# Patient Record
Sex: FEMALE | Race: WHITE | Employment: UNEMPLOYED | URBAN - METROPOLITAN AREA
[De-identification: names, ages, dates, MRNs, and addresses within clinical notes are randomized per-mention and may not be internally consistent; named-entity substitution may affect disease eponyms.]

---

## 2022-11-10 ENCOUNTER — ANESTHESIA (OUTPATIENT)
Dept: LABOR AND DELIVERY | Age: 32
DRG: 560 | End: 2022-11-10
Payer: MEDICAID

## 2022-11-10 ENCOUNTER — ANESTHESIA EVENT (OUTPATIENT)
Dept: LABOR AND DELIVERY | Age: 32
DRG: 560 | End: 2022-11-10
Payer: MEDICAID

## 2022-11-10 ENCOUNTER — APPOINTMENT (OUTPATIENT)
Dept: LABOR AND DELIVERY | Age: 32
DRG: 560 | End: 2022-11-10
Payer: MEDICAID

## 2022-11-10 ENCOUNTER — HOSPITAL ENCOUNTER (INPATIENT)
Age: 32
LOS: 2 days | Discharge: HOME | DRG: 560 | End: 2022-11-12
Attending: OBSTETRICS & GYNECOLOGY | Admitting: OBSTETRICS & GYNECOLOGY
Payer: MEDICAID

## 2022-11-10 PROBLEM — Z34.03 SUPERVISION OF LOW-RISK FIRST PREGNANCY, THIRD TRIMESTER: Status: ACTIVE | Noted: 2022-11-10

## 2022-11-10 LAB
ABO/RH: NORMAL
ALBUMIN SERPL-MCNC: 3.1 G/DL (ref 3.5–5.2)
ALP BLD-CCNC: 170 U/L (ref 35–104)
ALT SERPL-CCNC: 19 U/L (ref 0–32)
AMPHETAMINE SCREEN, URINE: NOT DETECTED
ANION GAP SERPL CALCULATED.3IONS-SCNC: 12 MMOL/L (ref 7–16)
ANTIBODY SCREEN: NORMAL
AST SERPL-CCNC: 18 U/L (ref 0–31)
BARBITURATE SCREEN URINE: NOT DETECTED
BASOPHILS ABSOLUTE: 0.02 E9/L (ref 0–0.2)
BASOPHILS RELATIVE PERCENT: 0.2 % (ref 0–2)
BENZODIAZEPINE SCREEN, URINE: NOT DETECTED
BILIRUB SERPL-MCNC: 0.6 MG/DL (ref 0–1.2)
BUN BLDV-MCNC: 6 MG/DL (ref 6–20)
CALCIUM SERPL-MCNC: 9.4 MG/DL (ref 8.6–10.2)
CANNABINOID SCREEN URINE: POSITIVE
CHLORIDE BLD-SCNC: 103 MMOL/L (ref 98–107)
CO2: 22 MMOL/L (ref 22–29)
COCAINE METABOLITE SCREEN URINE: NOT DETECTED
CREAT SERPL-MCNC: 0.6 MG/DL (ref 0.5–1)
EOSINOPHILS ABSOLUTE: 0.11 E9/L (ref 0.05–0.5)
EOSINOPHILS RELATIVE PERCENT: 1.2 % (ref 0–6)
FENTANYL SCREEN, URINE: NOT DETECTED
GFR SERPL CREATININE-BSD FRML MDRD: >60 ML/MIN/1.73
GLUCOSE BLD-MCNC: 101 MG/DL (ref 74–99)
HCT VFR BLD CALC: 38.2 % (ref 34–48)
HEMOGLOBIN: 12.8 G/DL (ref 11.5–15.5)
IMMATURE GRANULOCYTES #: 0.09 E9/L
IMMATURE GRANULOCYTES %: 1 % (ref 0–5)
INTEGRITY CHECK, CREATININE, URINE: 14.6
INTEGRITY CHECK, OXIDANT, URINE: <40
INTEGRITY CHECK, PH, URINE: 7.7 (ref 4.5–9)
INTEGRITY CHECK, SPECIFIC GRAVITY, URINE: 1 (ref 1–1.03)
INTEGRITY CHECK, SPECIMEN INTEGRITY, URINE: ABNORMAL
LYMPHOCYTES ABSOLUTE: 2.31 E9/L (ref 1.5–4)
LYMPHOCYTES RELATIVE PERCENT: 25.7 % (ref 20–42)
Lab: ABNORMAL
MCH RBC QN AUTO: 29.3 PG (ref 26–35)
MCHC RBC AUTO-ENTMCNC: 33.5 % (ref 32–34.5)
MCV RBC AUTO: 87.4 FL (ref 80–99.9)
METHADONE SCREEN, URINE: NOT DETECTED
MONOCYTES ABSOLUTE: 0.76 E9/L (ref 0.1–0.95)
MONOCYTES RELATIVE PERCENT: 8.5 % (ref 2–12)
NEUTROPHILS ABSOLUTE: 5.69 E9/L (ref 1.8–7.3)
NEUTROPHILS RELATIVE PERCENT: 63.4 % (ref 43–80)
OPIATE SCREEN URINE: NOT DETECTED
OXYCODONE URINE: NOT DETECTED
PDW BLD-RTO: 14.5 FL (ref 11.5–15)
PHENCYCLIDINE SCREEN URINE: NOT DETECTED
PLATELET # BLD: 270 E9/L (ref 130–450)
PMV BLD AUTO: 11.3 FL (ref 7–12)
POTASSIUM SERPL-SCNC: 3.8 MMOL/L (ref 3.5–5)
RBC # BLD: 4.37 E12/L (ref 3.5–5.5)
SODIUM BLD-SCNC: 137 MMOL/L (ref 132–146)
TOTAL PROTEIN: 6.4 G/DL (ref 6.4–8.3)
WBC # BLD: 9 E9/L (ref 4.5–11.5)

## 2022-11-10 PROCEDURE — 80053 COMPREHEN METABOLIC PANEL: CPT

## 2022-11-10 PROCEDURE — 6370000000 HC RX 637 (ALT 250 FOR IP): Performed by: OBSTETRICS & GYNECOLOGY

## 2022-11-10 PROCEDURE — 2580000003 HC RX 258: Performed by: ADVANCED PRACTICE MIDWIFE

## 2022-11-10 PROCEDURE — 2500000003 HC RX 250 WO HCPCS: Performed by: ANESTHESIOLOGY

## 2022-11-10 PROCEDURE — 86901 BLOOD TYPING SEROLOGIC RH(D): CPT

## 2022-11-10 PROCEDURE — 0HQ9XZZ REPAIR PERINEUM SKIN, EXTERNAL APPROACH: ICD-10-PCS | Performed by: OBSTETRICS & GYNECOLOGY

## 2022-11-10 PROCEDURE — 80074 ACUTE HEPATITIS PANEL: CPT

## 2022-11-10 PROCEDURE — 36415 COLL VENOUS BLD VENIPUNCTURE: CPT

## 2022-11-10 PROCEDURE — 7200000001 HC VAGINAL DELIVERY

## 2022-11-10 PROCEDURE — 86850 RBC ANTIBODY SCREEN: CPT

## 2022-11-10 PROCEDURE — 4A1HXCZ MONITORING OF PRODUCTS OF CONCEPTION, CARDIAC RATE, EXTERNAL APPROACH: ICD-10-PCS | Performed by: OBSTETRICS & GYNECOLOGY

## 2022-11-10 PROCEDURE — 86900 BLOOD TYPING SEROLOGIC ABO: CPT

## 2022-11-10 PROCEDURE — 1220000001 HC SEMI PRIVATE L&D R&B

## 2022-11-10 PROCEDURE — 59050 FETAL MONITOR W/REPORT: CPT

## 2022-11-10 PROCEDURE — 3700000025 EPIDURAL BLOCK: Performed by: ANESTHESIOLOGY

## 2022-11-10 PROCEDURE — 80307 DRUG TEST PRSMV CHEM ANLYZR: CPT

## 2022-11-10 PROCEDURE — 3E033VJ INTRODUCTION OF OTHER HORMONE INTO PERIPHERAL VEIN, PERCUTANEOUS APPROACH: ICD-10-PCS | Performed by: OBSTETRICS & GYNECOLOGY

## 2022-11-10 PROCEDURE — 6360000002 HC RX W HCPCS: Performed by: OBSTETRICS & GYNECOLOGY

## 2022-11-10 PROCEDURE — 85025 COMPLETE CBC W/AUTO DIFF WBC: CPT

## 2022-11-10 PROCEDURE — G0480 DRUG TEST DEF 1-7 CLASSES: HCPCS

## 2022-11-10 RX ORDER — ONDANSETRON 2 MG/ML
4 INJECTION INTRAMUSCULAR; INTRAVENOUS EVERY 6 HOURS PRN
Status: DISCONTINUED | OUTPATIENT
Start: 2022-11-10 | End: 2022-11-12 | Stop reason: HOSPADM

## 2022-11-10 RX ORDER — LIDOCAINE HYDROCHLORIDE 10 MG/ML
INJECTION, SOLUTION INFILTRATION; PERINEURAL
Status: DISPENSED
Start: 2022-11-10 | End: 2022-11-11

## 2022-11-10 RX ORDER — SODIUM CHLORIDE 0.9 % (FLUSH) 0.9 %
5-40 SYRINGE (ML) INJECTION PRN
Status: DISCONTINUED | OUTPATIENT
Start: 2022-11-10 | End: 2022-11-12 | Stop reason: HOSPADM

## 2022-11-10 RX ORDER — SODIUM CHLORIDE 9 MG/ML
INJECTION, SOLUTION INTRAVENOUS PRN
Status: DISCONTINUED | OUTPATIENT
Start: 2022-11-10 | End: 2022-11-12 | Stop reason: HOSPADM

## 2022-11-10 RX ORDER — MISOPROSTOL 200 UG/1
800 TABLET ORAL PRN
Status: DISCONTINUED | OUTPATIENT
Start: 2022-11-10 | End: 2022-11-12 | Stop reason: HOSPADM

## 2022-11-10 RX ORDER — ACETAMINOPHEN 500 MG
1000 TABLET ORAL EVERY 8 HOURS
Status: DISCONTINUED | OUTPATIENT
Start: 2022-11-10 | End: 2022-11-12 | Stop reason: HOSPADM

## 2022-11-10 RX ORDER — DOCUSATE SODIUM 100 MG/1
100 CAPSULE, LIQUID FILLED ORAL DAILY
Status: DISCONTINUED | OUTPATIENT
Start: 2022-11-10 | End: 2022-11-12 | Stop reason: HOSPADM

## 2022-11-10 RX ORDER — SODIUM CHLORIDE 0.9 % (FLUSH) 0.9 %
5-40 SYRINGE (ML) INJECTION EVERY 12 HOURS SCHEDULED
Status: DISCONTINUED | OUTPATIENT
Start: 2022-11-10 | End: 2022-11-12 | Stop reason: HOSPADM

## 2022-11-10 RX ORDER — METHYLERGONOVINE MALEATE 0.2 MG/ML
200 INJECTION INTRAVENOUS PRN
Status: DISCONTINUED | OUTPATIENT
Start: 2022-11-10 | End: 2022-11-12 | Stop reason: HOSPADM

## 2022-11-10 RX ORDER — SODIUM CHLORIDE, SODIUM LACTATE, POTASSIUM CHLORIDE, CALCIUM CHLORIDE 600; 310; 30; 20 MG/100ML; MG/100ML; MG/100ML; MG/100ML
INJECTION, SOLUTION INTRAVENOUS CONTINUOUS
Status: DISCONTINUED | OUTPATIENT
Start: 2022-11-10 | End: 2022-11-12 | Stop reason: HOSPADM

## 2022-11-10 RX ORDER — NALOXONE HYDROCHLORIDE 0.4 MG/ML
INJECTION, SOLUTION INTRAMUSCULAR; INTRAVENOUS; SUBCUTANEOUS PRN
Status: DISCONTINUED | OUTPATIENT
Start: 2022-11-10 | End: 2022-11-12 | Stop reason: HOSPADM

## 2022-11-10 RX ORDER — FERROUS SULFATE 325(65) MG
325 TABLET ORAL 2 TIMES DAILY WITH MEALS
Status: DISCONTINUED | OUTPATIENT
Start: 2022-11-10 | End: 2022-11-12 | Stop reason: HOSPADM

## 2022-11-10 RX ORDER — SODIUM CHLORIDE 9 MG/ML
25 INJECTION, SOLUTION INTRAVENOUS PRN
Status: DISCONTINUED | OUTPATIENT
Start: 2022-11-10 | End: 2022-11-12 | Stop reason: HOSPADM

## 2022-11-10 RX ORDER — SODIUM CHLORIDE, SODIUM LACTATE, POTASSIUM CHLORIDE, AND CALCIUM CHLORIDE .6; .31; .03; .02 G/100ML; G/100ML; G/100ML; G/100ML
1000 INJECTION, SOLUTION INTRAVENOUS PRN
Status: DISCONTINUED | OUTPATIENT
Start: 2022-11-10 | End: 2022-11-12 | Stop reason: HOSPADM

## 2022-11-10 RX ORDER — SODIUM CHLORIDE, SODIUM LACTATE, POTASSIUM CHLORIDE, AND CALCIUM CHLORIDE .6; .31; .03; .02 G/100ML; G/100ML; G/100ML; G/100ML
500 INJECTION, SOLUTION INTRAVENOUS PRN
Status: DISCONTINUED | OUTPATIENT
Start: 2022-11-10 | End: 2022-11-12 | Stop reason: HOSPADM

## 2022-11-10 RX ORDER — CARBOPROST TROMETHAMINE 250 UG/ML
250 INJECTION, SOLUTION INTRAMUSCULAR PRN
Status: DISCONTINUED | OUTPATIENT
Start: 2022-11-10 | End: 2022-11-12 | Stop reason: HOSPADM

## 2022-11-10 RX ORDER — IBUPROFEN 800 MG/1
800 TABLET ORAL EVERY 8 HOURS
Status: DISCONTINUED | OUTPATIENT
Start: 2022-11-10 | End: 2022-11-12 | Stop reason: HOSPADM

## 2022-11-10 RX ORDER — DOCUSATE SODIUM 100 MG/1
100 CAPSULE, LIQUID FILLED ORAL 2 TIMES DAILY
Status: DISCONTINUED | OUTPATIENT
Start: 2022-11-10 | End: 2022-11-10 | Stop reason: SDUPTHER

## 2022-11-10 RX ORDER — MODIFIED LANOLIN
OINTMENT (GRAM) TOPICAL PRN
Status: DISCONTINUED | OUTPATIENT
Start: 2022-11-10 | End: 2022-11-12 | Stop reason: HOSPADM

## 2022-11-10 RX ADMIN — IBUPROFEN 800 MG: 800 TABLET, FILM COATED ORAL at 20:02

## 2022-11-10 RX ADMIN — Medication 5 ML: at 14:46

## 2022-11-10 RX ADMIN — SODIUM CHLORIDE, POTASSIUM CHLORIDE, SODIUM LACTATE AND CALCIUM CHLORIDE: 600; 310; 30; 20 INJECTION, SOLUTION INTRAVENOUS at 08:10

## 2022-11-10 RX ADMIN — Medication 10 ML: at 14:36

## 2022-11-10 RX ADMIN — Medication 15 ML/HR: at 14:45

## 2022-11-10 RX ADMIN — Medication 2 MILLI-UNITS/MIN: at 08:52

## 2022-11-10 ASSESSMENT — PAIN SCALES - GENERAL: PAINLEVEL_OUTOF10: 3

## 2022-11-10 ASSESSMENT — LIFESTYLE VARIABLES
SMOKING_STATUS: 0
SMOKING_STATUS: 0

## 2022-11-10 NOTE — PROGRESS NOTES
Dr. Zuri Craig at bedside. Notified of patient status and cervical exam. MD states to start pitocin.

## 2022-11-10 NOTE — PROGRESS NOTES
This RN calls out from room for nursery to come to delivery and to call Dr. Molly Soto for delivery now.

## 2022-11-10 NOTE — ANESTHESIA PRE PROCEDURE
Department of Anesthesiology  Preprocedure Note       Name:  Danielle Orellana   Age:  29 y.o.  :  1990                                          MRN:  [id number]         Date:  11/10/2022      Surgeon: [address] [place] [address]    Procedure: LABOR OPTIONS (AUNDREA)    Medications prior to admission:   Prior to Admission medications    Medication Sig Start Date End Date Taking?  Authorizing Provider   Prenatal MV-Min-Fe Fum-FA-DHA (PRENATAL 1 PO) Take by mouth   Yes Historical Provider, MD       Current medications:    Current Facility-Administered Medications   Medication Dose Route Frequency Provider Last Rate Last Admin    lactated ringers infusion   IntraVENous Continuous Bhargavi Lipschutz, APRN -  mL/hr at 11/10/22 1244 Rate Verify at 11/10/22 1244    lactated ringers bolus  500 mL IntraVENous PRN Main Bolus Ridenbaugh, APRN - CNM        Or    lactated ringers bolus  1,000 mL IntraVENous PRN Bhargavi Lipschutz, APRN - CNM        sodium chloride flush 0.9 % injection 5-40 mL  5-40 mL IntraVENous 2 times per day Bhargavi Lipschutz, APRN - CNM        sodium chloride flush 0.9 % injection 5-40 mL  5-40 mL IntraVENous PRN Bhargavi Lipschutz, APRN - CNM        0.9 % sodium chloride infusion  25 mL IntraVENous PRN Bhargavi Lipschutz, APRN - CNM        methylergonovine (METHERGINE) injection 200 mcg  200 mcg IntraMUSCular PRN Bhargavi Lipschutz, APRN - CNM        carboprost (HEMABATE) injection 250 mcg  250 mcg IntraMUSCular PRN Bhargavi Lipschutz, APRN - CNM        miSOPROStol (CYTOTEC) tablet 800 mcg  800 mcg Rectal PRN Bhargavi Lipschutz, APRN - CNM        tranexamic acid (CYKLOKAPRON) 1,000 mg in sodium chloride 0.9 % 100 mL IVPB  1,000 mg IntraVENous Once PRN Main Bolus Ridenbaugh, APRN - CNM        oxytocin (PITOCIN) 30 units in 500 mL infusion  87.3 des-units/min IntraVENous Continuous PRN Main Bolus Ridenbaugh, APRN - CNM        And    oxytocin (PITOCIN) 30 units in 500 mL infusion  909 des-units/min IntraVENous PRN JEOVANY Gonzales CNM        ondansetron [facility]) injection 4 mg  4 mg IntraVENous Q6H PRN JEOVANY Gonzales CNM        docusate sodium (COLACE) capsule 100 mg  100 mg Oral BID Lydia ElaineJEOVANY Dickerson CNM        oxytocin (PITOCIN) 30 units in 500 mL infusion  1-20 des-units/min IntraVENous Continuous Virgie Hodgkin, MD 12 mL/hr at 11/10/22 1244 12 des-units/min at 11/10/22 1244    lidocaine 1 % injection             naloxone 0.4 mg in 10 mL sodium chloride syringe   IntraVENous PRN Chalmers Sicard, MD        ondansetron [facility]) injection 4 mg  4 mg IntraVENous Q6H PRN Chalmers Sicard, MD        fentaNYL 1.85mcg/ml and bupivacaine 0.1% 15ml syringe (Home [facility]) (OB) 15 mL epidural  15 mL Epidural Once Chalmers Sicard, MD        fentaNYL 1.85mcg/ml and Bupivicaine 0.1% in 0.9% NS 135ml infusion (OB) epidural  15 mL/hr Epidural Continuous Chalmers Sicard, MD           Allergies: Allergies   Allergen Reactions    Pcn [Penicillins]        Problem List:    Patient Active Problem List   Diagnosis Code    Supervision of low-risk first pregnancy, third trimester Z34.03    Diffuse cystic mastopathy N60.19    History of narcotic addiction ([facility] [address] F11.21    Pap smear of cervix with ASCUS, cannot exclude HGSIL R87.611       Past Medical History:        Diagnosis Date    Hepatitis C     History of cholecystectomy        Past Surgical History:  No past surgical history on file.     Social History:    Social History     Tobacco Use    Smoking status: Not on file    Smokeless tobacco: Not on file   Substance Use Topics    Alcohol use: Not on file                                Counseling given: Not Answered      Vital Signs (Current):   Vitals:    11/10/22 0955 11/10/22 1022 11/10/22 1125 11/10/22 1216   BP: 118/68  126/71 118/60   Pulse: 78  64 82   Resp:       Temp:       TempSrc:       SpO2:       Weight:  298 lb (135.2 kg)     Height:  5' 7\" (1.702 m) BP Readings from Last 3 Encounters:   11/10/22 118/60       NPO Status: Time of last liquid consumption: 0400                        Time of last solid consumption: 0400                        Date of last liquid consumption: 11/10/22                        Date of last solid food consumption: 11/10/22    BMI:   Wt Readings from Last 3 Encounters:   11/10/22 298 lb (135.2 kg)     Body mass index is 46.67 kg/m². CBC:   Lab Results   Component Value Date/Time    WBC 9.0 11/10/2022 08:20 AM    RBC 4.37 11/10/2022 08:20 AM    RBC 3.39 10/26/2015 03:12 AM    HGB 12.8 11/10/2022 08:20 AM    HCT 38.2 11/10/2022 08:20 AM    MCV 87.4 11/10/2022 08:20 AM    RDW 14.5 11/10/2022 08:20 AM     11/10/2022 08:20 AM       CMP:   Lab Results   Component Value Date/Time     11/10/2022 08:20 AM    K 3.8 11/10/2022 08:20 AM     11/10/2022 08:20 AM    CO2 22 11/10/2022 08:20 AM    BUN 6 11/10/2022 08:20 AM    CREATININE 0.6 11/10/2022 08:20 AM    LABGLOM >60 11/10/2022 08:20 AM    GLUCOSE 101 11/10/2022 08:20 AM    PROT 6.4 11/10/2022 08:20 AM    CALCIUM 9.4 11/10/2022 08:20 AM    BILITOT 0.6 11/10/2022 08:20 AM    ALKPHOS 170 11/10/2022 08:20 AM    AST 18 11/10/2022 08:20 AM    ALT 19 11/10/2022 08:20 AM       POC Tests: No results for input(s): POCGLU, POCNA, POCK, POCCL, POCBUN, POCHEMO, POCHCT in the last 72 hours. Coags:   Lab Results   Component Value Date/Time    PROTIME 10.4 10/20/2015 03:45 AM    INR 1.0 10/20/2015 03:45 AM    APTT 55.4 10/19/2015 04:10 AM       HCG (If Applicable): No results found for: PREGTESTUR, PREGSERUM, HCG, HCGQUANT     ABGs:   Lab Results   Component Value Date/Time    BEART -0.7 10/23/2015 04:55 AM        Type & Screen (If Applicable):  Lab Results   Component Value Date    LABABO A 10/21/2015       Drug/Infectious Status (If Applicable):  Lab Results   Component Value Date/Time    HEPCAB DETECTED 10/17/2015 11:20 AM       COVID-19 Screening (If Applicable):  No results found for: COVID19        Anesthesia Evaluation  Patient summary reviewed and Nursing notes reviewed no history of anesthetic complications:   Airway: Mallampati: III  TM distance: >3 FB   Neck ROM: full  Mouth opening: > = 3 FB   Dental:          Pulmonary:normal exam        (-) recent URI and not a current smoker                           Cardiovascular:Negative CV ROS  Exercise tolerance: good (>4 METS),       (-) hypertension and dysrhythmias      Rhythm: regular  Rate: normal           Beta Blocker:  Not on Beta Blocker         Neuro/Psych:                ROS comment: +UDS marijuana   GI/Hepatic/Renal:   (+) GERD: well controlled, hepatitis: C, liver disease (Elevated alk phos):, morbid obesity          Endo/Other:        (-) blood dyscrasia, no electrolyte abnormalities        Pt had no PAT visit       Abdominal:   (+) obese,           Vascular: negative vascular ROS. Other Findings:           Anesthesia Plan      ASA 2             Anesthetic plan and risks discussed with patient.         Attending anesthesiologist reviewed and agrees with Preprocedure content                JEOVANY Stallworth CRNA   11/10/2022

## 2022-11-10 NOTE — PROGRESS NOTES
RN at bedside for epidural. Continuing to try to trace fetal heart rate. Signal intermittent due to maternal size and positioning for epidural. Patient perceives fetal movement and audible fetal movement noted.

## 2022-11-10 NOTE — PROGRESS NOTES
I was called to room to [facility] by\" for patient with strong urge to push. Upon entering room, patient found to be complete and +2, actively pushing. I quickly gowned and gloved in time for patient to deliver a viable female infant. Infant head was  upon my arrival, patient pushed well to deliver a viable female infant: OA-->SLY. A nuchal cord was noted and reduced x 1. After restitution, the anterior shoulder delivered easily with maternal effort and the rest of the body easily followed. Pitocin was opened at that time. Spontaneous respirations and cries noted so infant was placed upon maternal abdomen. Once pulsing ceased, the umbilical cord was clamped x 2 and cut by fob. Cord blood collected. Placenta delivered several minutes later via maternal effort and gentle traction via Palomo Skeans mechanism. Fundus firm at U/1 and midline. Perineal inspection revealed bilateral labial abrasions. One abrasion on the right was oozing so I placed 1 interrupted stitch of 3-0 vicryl. Hemostasis was achieved and patient was stable. Dr. Luis Miguel Nixon was present at that time, please refer to his notes for ongoing status and care.

## 2022-11-10 NOTE — ANESTHESIA PROCEDURE NOTES
Epidural Block    Patient location during procedure: OB  Start time: 11/10/2022 2:18 PM  End time: 11/10/2022 2:30 PM  Reason for block: labor epidural  Staffing  Performed: resident/CRNA   Anesthesiologist: Helen Jaramillo MD  Resident/CRNA: JEOVANY Alaniz CRNA  Epidural  Patient position: sitting  Prep: Betadine  Patient monitoring: cardiac monitor, continuous pulse ox and frequent blood pressure checks  Approach: midline  Location: L3-4  Injection technique: JUAN air  Provider prep: mask and sterile gloves  Needle  Needle type: Tuohy   Needle gauge: 18 G  Needle length: 3.5 in  Needle insertion depth: 9 cm  Catheter type: end hole  Catheter size: 20 G.   Catheter at skin depth: 15 cm  Test dose: negativeCatheter Secured: tegaderm and tape  Assessment  Sensory level: T6  Hemodynamics: stable  Attempts: 1  Outcomes: uncomplicated and patient tolerated procedure well  Preanesthetic Checklist  Completed: patient identified, IV checked, site marked, risks and benefits discussed, surgical/procedural consents, equipment checked, pre-op evaluation, timeout performed, anesthesia consent given, oxygen available and monitors applied/VS acknowledged

## 2022-11-10 NOTE — L&D DELIVERY SUMMARY NOTE
Department of Obstetrics and Gynecology  Spontaneous Vaginal Delivery Note  Roula Henley QQI,[id number]    Pre-operative Diagnosis: Term pregnancy induction of labor with Pitocin history of hepatitis C and class III obesity    Post-operative Diagnosis:  Living  infant(s) and Female    Information for the patient's :  Mk López [[id number]]                Female infant       Infant Wt:   Information for the patient's :  Mk López [[id number]]      6 pound 11 ounce    APGARS:     Information for the patient's :  Mk López [[id number]]      8 at 1 minute 9 at 5 minutes     Anesthesia:  epidural anesthesia    Application and Delivery: First-degree perineal tear    Delivery Summary:   Patient is [facility] to [facility] and placed on external monitors. Pitocin induction started as per protocol contractions are irregular,FHT reactive with moderate variability without decels. Pt received analgesics IV and or epidural anesthesia. Progress of labor as anticipated,  and now fully dilated cervix. With subsequent contractions she started pushing and delivered vaginally spontaneously without any complications with first-degree perineal tear superficial,Placenta and cord and membranes delivered spontaneously. Pt is given pitocin in IV fluids. .First-degree perineal.  Superficial tear repaired in standard manner. Vaginal walls,cervix,perineum,rectum intact. Uterus is well contracted. Pt is watched for next 1 hour. mother and baby both are  stable and doing well. Routine postpartum care    Specimen:  Placenta sent to pathology No    Estimated blood loss: 300 cc             Condition:  infant stable to general nursery and mother stable    Awaiting for hepatitis B surface antigen report           Infant Feeding:    both breast and bottle - Similac with low iron    Erik Limon MD, M.D. 11/10/2022 5:55 PM    [place]

## 2022-11-10 NOTE — PROGRESS NOTES
Patient is a  @ 39.0 weeks gestation here for a scheduled induction. Patient denies any pain or complaints. Ambulatory upon arrival. Accompanied by her boyfriend Shawn Jovel.

## 2022-11-10 NOTE — PROGRESS NOTES
Kimmie Cardoza in room for procedure at 1412. Patient sitting at side of bed for epidural insertion at 1400. Epidural catheter placed at 29  Test dose given by SEBASTIAN Chaudhry at 1429. Epidural bolus given by SEBASTIAN Mckeon at    Patient returned to semi-skinner's position in bed at 1432. Epidural pump programmed to continuously infuse by SEBASTIAN Chaudhry at 1445.

## 2022-11-10 NOTE — H&P
Department of Obstetrics and Gynecology   Obstetrics History and Physical      [place] Seat  11/10/2022  [id number]    CHIEF COMPLAINT: Term pregnancy    HISTORY OF PRESENT ILLNESS:      The patient is a 29 y.o. female  at term pregnancy for induction of labor with Pitocin with inducible cervix Ramírez score more than 6 GBS negative  OB History          1    Para        Term                AB        Living             SAB        IAB        Ectopic        Molar        Multiple        Live Births                Patient presents with a chief complaint as above and is being admitted for induction of labor with Pitocin    Estimated Due Date: Estimated Date of Delivery: None noted. PRENATAL CARE:    Complicated by: There is no problem list on file for this patient. PAST OB HISTORY  OB History          1    Para        Term                AB        Living             SAB        IAB        Ectopic        Molar        Multiple        Live Births                    Past Medical History:    No past medical history on file. Past Surgical History:    No past surgical history on file.   Allergies:  Pcn [penicillins]  Social History:    Social History     Socioeconomic History    Marital status: Single     Spouse name: Not on file    Number of children: Not on file    Years of education: Not on file    Highest education level: Not on file   Occupational History    Not on file   Tobacco Use    Smoking status: Not on file    Smokeless tobacco: Not on file   Substance and Sexual Activity    Alcohol use: Not on file    Drug use: Not on file    Sexual activity: Not on file   Other Topics Concern    Not on file   Social History Narrative    Not on file     Social Determinants of Health     Financial Resource Strain: Not on file   Food Insecurity: Not on file   Transportation Needs: Not on file   Physical Activity: Not on file   Stress: Not on file   Social Connections: Not on file   Intimate Partner Violence: Not on file   Housing Stability: Not on file     Family History:   No family history on file. Medications Prior to Admission:  No medications prior to admission. REVIEW OF SYSTEMS:    CONSTITUTIONAL:  negative  RESPIRATORY:  negative  CARDIOVASCULAR:  negative  GASTROINTESTINAL:  Negative  GENITOURINARY: Negative  ALLERGIC/IMMUNOLOGIC:  negative  NEUROLOGICAL:  negative  BEHAVIOR/PSYCH:  negative    PHYSICAL EXAM:  There were no vitals taken for this visit. General appearance:  awake, alert, cooperative, no apparent distress, and appears stated age  Abdomen:  Gravid  uterus, consistent with her gestational age 42reg  Uterine contractions. , CVA tenderness No      Fetal heart rate:  Reassuring.  140,with accels and moderate variability,no decels,  Pelvis:  Adequate pelvis  Cervix: soft   3 cm   50%    -2  Presentation: CEPHALIC  Membranes:  intact  Extremities: nontender bilaterally,edema1+    DATA:  Labs:  CBC:   Lab Results   Component Value Date/Time    WBC 8.1 10/26/2015 03:12 AM    RBC 3.39 10/26/2015 03:12 AM    HGB 9.9 10/26/2015 03:12 AM    HCT 29.9 10/26/2015 03:12 AM    MCV 88.3 10/26/2015 03:12 AM    RDW 13.5 10/26/2015 03:12 AM     10/26/2015 03:12 AM     CMP:    Lab Results   Component Value Date/Time     10/28/2015 05:31 AM    K 3.9 10/28/2015 05:31 AM     10/28/2015 05:31 AM    CO2 23 10/28/2015 05:31 AM    BUN 8 10/28/2015 05:31 AM    PROT 6.9 10/26/2015 03:12 AM     U/A:  No components found for: [employer], USPGRAV, UPH, UPROTEIN, UGLUCOSE, UKETONE, UBILI, UBLOOD, UNITRITE, UUROBIL, ULEUKEST, USQEPI, URENEPI, UWBC, URBC, Synchari, Brickeys  TSH:    Lab Results   Component Value Date/Time    TSH 4.07 10/17/2015 11:20 AM     RPR:  No results found for: RPR  RUBELLA: No results found for: RUBELLAIGG  HEPBSAG: No results found for: HBSAGI  HIV:  No results found for: HIV  HgBA1c:  No components found for: HGBA1C  Blood Type:  No results found for: ABOINT  RH:  No results found for: ANATITER, C3, C4, RF  Antibody Screen:  No components found for: ABSCINT  Gonorrhea:  No components found for: [address]  Chlamydia:  No components found for: CCHLAM  gbs-STREP B SCREEN: No results found for: GBSAG    No orders to display       No results found for this visit on 11/10/22. ASSESSMENT AND PLAN:full term pregnancy, history of hepatitis C, GBS negative class III obesity  Active Problems:    * No active hospital problems. *  Resolved Problems:    * No resolved hospital problems.  *    Pitocin induction as per protocol  Labor: OBSERVATION, anticipate normal delivery, routine labor orders  Fetus: Reassuring  GBS: Negative  IVFluids,labs,analgesics/epidural as needed        Electronically signed by Fifi Curtis MD on 11/10/2022 at 8:17 AM

## 2022-11-10 NOTE — PROGRESS NOTES
Dr. Jannis Libman called and notified that patient is requesting an epidural. MD states she may have one.

## 2022-11-11 LAB
COMMENT: NORMAL
HCT VFR BLD CALC: 39.6 % (ref 34–48)
HEMOGLOBIN: 13.2 G/DL (ref 11.5–15.5)
THC NORMALIZED, QUANTITIATIVE, URINE: 225.3
THC-COOH, QUANTITATIVE, URINE: 32.9

## 2022-11-11 PROCEDURE — 6370000000 HC RX 637 (ALT 250 FOR IP): Performed by: OBSTETRICS & GYNECOLOGY

## 2022-11-11 PROCEDURE — 36415 COLL VENOUS BLD VENIPUNCTURE: CPT

## 2022-11-11 PROCEDURE — 85014 HEMATOCRIT: CPT

## 2022-11-11 PROCEDURE — 85018 HEMOGLOBIN: CPT

## 2022-11-11 PROCEDURE — 1220000001 HC SEMI PRIVATE L&D R&B

## 2022-11-11 RX ADMIN — DOCUSATE SODIUM 100 MG: 100 CAPSULE, LIQUID FILLED ORAL at 08:56

## 2022-11-11 RX ADMIN — IBUPROFEN 800 MG: 800 TABLET, FILM COATED ORAL at 14:14

## 2022-11-11 RX ADMIN — IBUPROFEN 800 MG: 800 TABLET, FILM COATED ORAL at 05:22

## 2022-11-11 ASSESSMENT — PAIN - FUNCTIONAL ASSESSMENT
PAIN_FUNCTIONAL_ASSESSMENT: ACTIVITIES ARE NOT PREVENTED
PAIN_FUNCTIONAL_ASSESSMENT: ACTIVITIES ARE NOT PREVENTED

## 2022-11-11 ASSESSMENT — PAIN SCALES - GENERAL
PAINLEVEL_OUTOF10: 0
PAINLEVEL_OUTOF10: 5
PAINLEVEL_OUTOF10: 3

## 2022-11-11 ASSESSMENT — PAIN DESCRIPTION - LOCATION
LOCATION: VAGINA
LOCATION: PERINEUM

## 2022-11-11 ASSESSMENT — PAIN DESCRIPTION - ORIENTATION: ORIENTATION: LOWER

## 2022-11-11 ASSESSMENT — PAIN DESCRIPTION - DESCRIPTORS
DESCRIPTORS: TENDER
DESCRIPTORS: PRESSURE;SORE

## 2022-11-11 NOTE — PLAN OF CARE
Problem: Pain  Goal: Verbalizes/displays adequate comfort level or baseline comfort level  Outcome: Progressing   Medicate as needed, comfort measures offered  Problem: Vaginal Birth or  Section  Goal: Fetal and maternal status remain reassuring during the birth process  Description:  Birth OB-Pregnancy care plan goal which identifies if the fetal and maternal status remain reassuring during the birth process  Outcome: Completed

## 2022-11-11 NOTE — PROGRESS NOTES
Spontaneous vaginal delivery of a viable female infant @ [phone] on 11/10/22. Pitocin bolus initiated after delivery of infant. Placenta delivered @ [phone]. Small repair in progress at this time. Birth weight 6lb 11oz. Apgars 8/9.

## 2022-11-11 NOTE — PROGRESS NOTES
Subjective:     Postpartum Day 1:   The patient feels well. The patient denies emotional concerns. Pain is well controlled with current medications. The baby iswell. Baby is feeding via bottle - Similac with low iron. Urinary output is adequate. The patient is ambulating well. The patient is tolerating a normal diet. Flatus has been passed. Objective:        Blood pressure (!) 115/90, pulse 96, temperature 98.1 °F (36.7 °C), resp. rate 15, height 5' 7\" (1.702 m), weight 298 lb (135.2 kg), SpO2 98 %, unknown if currently breastfeeding. No intake/output data recorded. Lab Results   Component Value Date    WBC 9.0 11/10/2022    HGB 13.2 11/11/2022    HCT 39.6 11/11/2022    MCV 87.4 11/10/2022     11/10/2022       General:    alert, appears stated age, and cooperative   Lungs:    Lochia:  appropriate   Uterine    firmnontender   Back         no pain to palpation   DVT Evaluation:  No evidence of DVT seen on physical exam.  Negative Nimco's sign.   @ LABS@    Assessment:     Postpartum day 1 doing well.female postpartum day 1  Principal Problem:    Supervision of low-risk first pregnancy, third trimester  Resolved Problems:    * No resolved hospital problems. *    Plan:     Continue current care. Prenatal vit daily,  Pain meds as needed, routine postpartum care      Irene Harley MD,M.D. 11/11/2022 7:23 AM    Yomaira Monge  1990  [phone]

## 2022-11-11 NOTE — PROGRESS NOTES
Hearing screening results were discussed with parent. Questions answered. Brochure given to parent. Advised to monitor developmental milestones and contact physician for any concerns.    Electronically signed by Deejay Moeller on 11/11/2022 at 8:43 AM

## 2022-11-11 NOTE — ANESTHESIA PRE PROCEDURE
Department of Anesthesiology  Preprocedure Note       Name:  Meeta Correa   Age:  29 y.o.  :  1990                                          MRN:  [id number]         Date:  11/10/2022      Surgeon: Gay York    Procedure: LABOR OPTIONS (AUNDREA)    Medications prior to admission:   Prior to Admission medications    Medication Sig Start Date End Date Taking? Authorizing Provider   Prenatal MV-Min-Fe Fum-FA-DHA (PRENATAL 1 PO) Take by mouth    Historical Provider, MD       Current medications:    No current facility-administered medications for this visit. No current outpatient medications on file.      Facility-Administered Medications Ordered in Other Visits   Medication Dose Route Frequency Provider Last Rate Last Admin    lactated ringers infusion   IntraVENous Continuous Sara Zhang, APRN -  mL/hr at 11/10/22 1611 Rate Verify at 11/10/22 1611    lactated ringers bolus  500 mL IntraVENous PRN JEOVANY Bui - CNCINDY        Or    lactated ringers bolus  1,000 mL IntraVENous PRN Sara Gadurany, APRN - CNM        sodium chloride flush 0.9 % injection 5-40 mL  5-40 mL IntraVENous 2 times per day Sara Gaudy, APRN - CNM        sodium chloride flush 0.9 % injection 5-40 mL  5-40 mL IntraVENous PRN Sara Gaudy, APRN - CNM        0.9 % sodium chloride infusion  25 mL IntraVENous PRN Sara Gaudy, APRN - CNM        methylergonovine (METHERGINE) injection 200 mcg  200 mcg IntraMUSCular PRN Sara Gaudy, APRN - CNM        carboprost (HEMABATE) injection 250 mcg  250 mcg IntraMUSCular PRN Sara Gaudy, APRN - CNM        miSOPROStol (CYTOTEC) tablet 800 mcg  800 mcg Rectal PRN Sara Gaudy, APRN - CNM        tranexamic acid (CYKLOKAPRON) 1,000 mg in sodium chloride 0.9 % 100 mL IVPB  1,000 mg IntraVENous Once PRN Ghislaine Hoffman APRN - CNM        oxytocin (PITOCIN) 30 units in 500 mL infusion  87.3 des-units/min IntraVENous Continuous PRN JEOVANY Tucker CNM        And    oxytocin (PITOCIN) 30 units in 500 mL infusion  909 des-units/min IntraVENous PRN JEOVANY Tucker CNM        ondansetron [facility]) injection 4 mg  4 mg IntraVENous Q6H PRN JEOVANY Tucker CNM        oxytocin (PITOCIN) 30 units in 500 mL infusion  1-20 des-units/min IntraVENous Continuous Mtich Alvarez MD 18 mL/hr at 11/10/22 1611 18 des-units/min at 11/10/22 1611    lidocaine 1 % injection             naloxone 0.4 mg in 10 mL sodium chloride syringe   IntraVENous PRN Marcela Webster MD        ondansetron [facility]) injection 4 mg  4 mg IntraVENous Q6H PRN Marcela Webster MD        fentaNYL 1.85mcg/ml and Bupivicaine 0.1% in 0.9% NS 135ml infusion (OB) epidural  15 mL/hr Epidural Continuous Marcela Webster MD 15 mL/hr at 11/10/22 1445 15 mL/hr at 11/10/22 1445    sodium chloride flush 0.9 % injection 5-40 mL  5-40 mL IntraVENous 2 times per day Mitch Alvarez MD        sodium chloride flush 0.9 % injection 5-40 mL  5-40 mL IntraVENous PRN Mitch Alvarez MD        0.9 % sodium chloride infusion   IntraVENous PRN Mitch Alvarez MD        rho(D) immune globulin (HYPERRHO S/D) injection 300 mcg  300 mcg IntraMUSCular Once Mitch Alvarez MD        docusate sodium (COLACE) capsule 100 mg  100 mg Oral Daily Mitch Alvarez MD        lansinoh lanolin ointment   Topical PRN Mitch Alvarez MD        witch hazel-glycerin (TUCKS) pad   Topical PRN Mitch Alvarez MD        benzocaine-menthol (DERMOPLAST) 20-0.5 % spray   Topical PRN Mitch Alvarez MD        ferrous sulfate (IRON 325) tablet 325 mg  325 mg Oral BID  Mitch Alvarez MD        measles, mumps & rubella vaccine (MMR) injection 0.5 mL  0.5 mL SubCUTAneous Prior to discharge Mitch Alvarez MD        tetanus-diphth-acell pertussis (BOOSTRIX) injection 0.5 mL  0.5 mL IntraMUSCular Prior to discharge Mitch Alvarez MD        acetaminophen (TYLENOL) tablet 1,000 mg  1,000 mg Oral Q8H Tiffanie Lima MD        ibuprofen (ADVIL;MOTRIN) tablet 800 mg  800 mg Oral Q8H Tiffanie Lima MD   800 mg at 11/10/22 2002       Allergies: Allergies   Allergen Reactions    Pcn [Penicillins]        Problem List:    Patient Active Problem List   Diagnosis Code    Supervision of low-risk first pregnancy, third trimester Z34.03    Diffuse cystic mastopathy N60.19    History of narcotic addiction ([facility] [address] F11.21    Pap smear of cervix with ASCUS, cannot exclude HGSIL R87.611       Past Medical History:        Diagnosis Date    Hepatitis C     History of cholecystectomy        Past Surgical History:  No past surgical history on file. Social History:    Social History     Tobacco Use    Smoking status: Not on file    Smokeless tobacco: Not on file   Substance Use Topics    Alcohol use: Not on file                                Counseling given: Not Answered      Vital Signs (Current): There were no vitals filed for this visit.                                            BP Readings from Last 3 Encounters:   11/10/22 126/75       NPO Status:                                                                                 BMI:   Wt Readings from Last 3 Encounters:   11/10/22 298 lb (135.2 kg)     There is no height or weight on file to calculate BMI.    CBC:   Lab Results   Component Value Date/Time    WBC 9.0 11/10/2022 08:20 AM    RBC 4.37 11/10/2022 08:20 AM    RBC 3.39 10/26/2015 03:12 AM    HGB 12.8 11/10/2022 08:20 AM    HCT 38.2 11/10/2022 08:20 AM    MCV 87.4 11/10/2022 08:20 AM    RDW 14.5 11/10/2022 08:20 AM     11/10/2022 08:20 AM       CMP:   Lab Results   Component Value Date/Time     11/10/2022 08:20 AM    K 3.8 11/10/2022 08:20 AM     11/10/2022 08:20 AM    CO2 22 11/10/2022 08:20 AM    BUN 6 11/10/2022 08:20 AM    CREATININE 0.6 11/10/2022 08:20 AM    LABGLOM >60 11/10/2022 08:20 AM    GLUCOSE 101 11/10/2022 08:20 AM    PROT 6.4 11/10/2022 08:20 AM    CALCIUM 9.4 11/10/2022 08:20 AM    BILITOT 0.6 11/10/2022 08:20 AM    ALKPHOS 170 11/10/2022 08:20 AM    AST 18 11/10/2022 08:20 AM    ALT 19 11/10/2022 08:20 AM       POC Tests: No results for input(s): POCGLU, POCNA, POCK, POCCL, POCBUN, POCHEMO, POCHCT in the last 72 hours. Coags:   Lab Results   Component Value Date/Time    PROTIME 10.4 10/20/2015 03:45 AM    INR 1.0 10/20/2015 03:45 AM    APTT 55.4 10/19/2015 04:10 AM       HCG (If Applicable): No results found for: PREGTESTUR, PREGSERUM, HCG, HCGQUANT     ABGs:   Lab Results   Component Value Date/Time    BEART -0.7 10/23/2015 04:55 AM        Type & Screen (If Applicable):  Lab Results   Component Value Date    LABABO A 10/21/2015       Drug/Infectious Status (If Applicable):  Lab Results   Component Value Date/Time    HEPCAB DETECTED 10/17/2015 11:20 AM       COVID-19 Screening (If Applicable): No results found for: COVID19        Anesthesia Evaluation  Patient summary reviewed and Nursing notes reviewed no history of anesthetic complications:   Airway: Mallampati: III  TM distance: >3 FB   Neck ROM: full  Mouth opening: > = 3 FB   Dental:          Pulmonary:normal exam        (-) recent URI and not a current smoker                           Cardiovascular:Negative CV ROS  Exercise tolerance: good (>4 METS),       (-) hypertension and dysrhythmias      Rhythm: regular  Rate: normal           Beta Blocker:  Not on Beta Blocker         Neuro/Psych:                ROS comment: +UDS marijuana   GI/Hepatic/Renal:   (+) GERD: well controlled, hepatitis: C, liver disease (Elevated alk phos):, morbid obesity          Endo/Other:        (-) blood dyscrasia, no electrolyte abnormalities        Pt had no PAT visit       Abdominal:   (+) obese,           Vascular: negative vascular ROS. Other Findings:             Anesthesia Plan      epidural     ASA 2             Anesthetic plan and risks discussed with patient.         Attending anesthesiologist reviewed and agrees with Preprocedure content                Chalmers Sicard, MD   11/10/2022

## 2022-11-11 NOTE — ANESTHESIA POSTPROCEDURE EVALUATION
Department of Anesthesiology  Postprocedure Note    Patient: Mariano Silveira  MRN: [id number]  YOB: 1990  Date of evaluation: 11/10/2022      Procedure Summary     Date: 11/10/22 Room / Location:     Anesthesia Start: 8262 Anesthesia Stop: 8515    Procedure: Labor Analgesia Diagnosis:     Scheduled Providers:  Responsible Provider: China Crum MD    Anesthesia Type: epidural ASA Status: 2          Anesthesia Type: No value filed.     Kirby Phase I:      Kirby Phase II:        Anesthesia Post Evaluation    Patient location during evaluation: bedside  Patient participation: complete - patient participated  Level of consciousness: awake and alert  Airway patency: patent  Nausea & Vomiting: no nausea and no vomiting  Complications: no  Cardiovascular status: hemodynamically stable  Respiratory status: acceptable  Hydration status: euvolemic  Comments: Patient satisfied with epidural for labor

## 2022-11-12 VITALS
WEIGHT: 293 LBS | RESPIRATION RATE: 16 BRPM | BODY MASS INDEX: 45.99 KG/M2 | HEART RATE: 79 BPM | OXYGEN SATURATION: 99 % | HEIGHT: 67 IN | SYSTOLIC BLOOD PRESSURE: 116 MMHG | TEMPERATURE: 98.4 F | DIASTOLIC BLOOD PRESSURE: 66 MMHG

## 2022-11-12 LAB
HAV IGM SER IA-ACNC: ABNORMAL
HEPATITIS B CORE IGM ANTIBODY: ABNORMAL
HEPATITIS B SURFACE ANTIGEN INTERPRETATION: ABNORMAL
HEPATITIS C ANTIBODY INTERPRETATION: REACTIVE

## 2022-11-12 PROCEDURE — 6360000002 HC RX W HCPCS: Performed by: OBSTETRICS & GYNECOLOGY

## 2022-11-12 PROCEDURE — 90715 TDAP VACCINE 7 YRS/> IM: CPT | Performed by: OBSTETRICS & GYNECOLOGY

## 2022-11-12 PROCEDURE — 90686 IIV4 VACC NO PRSV 0.5 ML IM: CPT | Performed by: OBSTETRICS & GYNECOLOGY

## 2022-11-12 PROCEDURE — 90471 IMMUNIZATION ADMIN: CPT | Performed by: OBSTETRICS & GYNECOLOGY

## 2022-11-12 PROCEDURE — G0008 ADMIN INFLUENZA VIRUS VAC: HCPCS | Performed by: OBSTETRICS & GYNECOLOGY

## 2022-11-12 PROCEDURE — 6370000000 HC RX 637 (ALT 250 FOR IP): Performed by: OBSTETRICS & GYNECOLOGY

## 2022-11-12 RX ORDER — IBUPROFEN 800 MG/1
800 TABLET ORAL EVERY 8 HOURS
Qty: 21 TABLET | Refills: 3 | Status: SHIPPED | OUTPATIENT
Start: 2022-11-12 | End: 2022-11-19

## 2022-11-12 RX ADMIN — IBUPROFEN 800 MG: 800 TABLET, FILM COATED ORAL at 00:10

## 2022-11-12 RX ADMIN — WITCH HAZEL: 500 SOLUTION RECTAL; TOPICAL at 09:01

## 2022-11-12 RX ADMIN — INFLUENZA A VIRUS A/VICTORIA/2570/2019 IVR-215 (H1N1) ANTIGEN (PROPIOLACTONE INACTIVATED), INFLUENZA A VIRUS A/DARWIN/6/2021 IVR-227 (H3N2) ANTIGEN (PROPIOLACTONE INACTIVATED), INFLUENZA B VIRUS B/AUSTRIA/1359417/2021 BVR-26 ANTIGEN (PROPIOLACTONE INACTIVATED), INFLUENZA B VIRUS B/PHUKET/3073/2013 BVR-1B ANTIGEN (PROPIOLACTONE INACTIVATED) 0.5 ML: 15; 15; 15; 15 INJECTION, SOLUTION INTRAMUSCULAR at 12:45

## 2022-11-12 RX ADMIN — Medication: at 09:01

## 2022-11-12 RX ADMIN — DOCUSATE SODIUM 100 MG: 100 CAPSULE, LIQUID FILLED ORAL at 08:28

## 2022-11-12 RX ADMIN — TETANUS TOXOID, REDUCED DIPHTHERIA TOXOID AND ACELLULAR PERTUSSIS VACCINE, ADSORBED 0.5 ML: 5; 2.5; 8; 8; 2.5 SUSPENSION INTRAMUSCULAR at 12:44

## 2022-11-12 RX ADMIN — IBUPROFEN 800 MG: 800 TABLET, FILM COATED ORAL at 08:28

## 2022-11-12 ASSESSMENT — PAIN DESCRIPTION - LOCATION
LOCATION: PERINEUM
LOCATION: ABDOMEN

## 2022-11-12 ASSESSMENT — PAIN SCALES - GENERAL
PAINLEVEL_OUTOF10: 3
PAINLEVEL_OUTOF10: 2

## 2022-11-12 ASSESSMENT — PAIN DESCRIPTION - DESCRIPTORS
DESCRIPTORS: DISCOMFORT;CRAMPING
DESCRIPTORS: TENDER

## 2022-11-12 NOTE — PLAN OF CARE
Problem: Pain  Goal: Verbalizes/displays adequate comfort level or baseline comfort level  Outcome: Progressing     Problem: Postpartum  Goal: Experiences normal postpartum course  Description:  Postpartum OB-Pregnancy care plan goal which identifies if the mother is experiencing a normal postpartum course  Outcome: Adequate for Discharge

## 2022-11-12 NOTE — DISCHARGE INSTRUCTIONS
Discharge Instructions for Labor and Delivery, Vaginal Birth   No tub bath for 6 weeks   You may take a shower   No driving for 2-3 weeks   No sexual activity,douching or tampons, No Work/School for 6 weeks   Walking and Stairs as tolerated. No heavy lifting     A vaginal birth refers to the baby being delivered through the vagina. The amount of time that labor can take varies greatly. Labor for the average first-born baby is about 12 hours. Usually your hospital stay after a routine delivery is no more than two nights. Some new mothers go home the same day. Recovery from childbirth varies depending upon whether you had an episiotomy (an incision in the perineum, the area between your vaginal opening and your anus), the duration of labor and delivery, and the amount of rest you get. In general, it takes about 6-8 weeks for a woman's body to recover from childbirth. What You Will Need   Along with your medications, you will need the following:   Sanitary pads    Nursing pads    Witch hazel pads    Vanita will want to arrange for transportation home for you and your baby. The baby will need a car seat. You will receive instructions in the hospital for breastfeeding and taking care of the perineum area. You may use ointment for cracked nipples or warm water rinses to your perineum. You will need to wear sanitary pads for about six weeks after delivery. If you had an episiotomy or vaginal tear, you will be sent home with a plastic squirt bottle. Fill it with warm water and squirt over the vaginal and anal area every time you urinate and defecate. Apply warm or cold cloths to sore breasts. Apply ointment to cracked nipples. Use nursing pads for leaky breast.    Apply witch hazel pads to sore perineum (area between vagina and anus). Ask your doctor about when it is safe for you to shower or bathe. Sit in a sitz bath to soothe sore perineum and/or hemorrhoids.  A sitz bath is soaking the hip and buttocks area in warm water. You can buy a plastic sitz bath at most [employer]. It will fit on your toilet. You can also use your bathtub. Diet    Eat a well balanced, healthy diet to help your recover from childbirth. If you are breastfeeding, you will need additional calories each day. You may also be advised to avoid certain foods. Some women experience constipation after childbirth. To avoid this problem:   Drink plenty of fluids. Eat foods high in fiber, such as:   Whole grain cereals and breads   Fruits and vegetables   Legumes (eg, beans, lentils)     Physical Activity    Labor and delivery is tiring. Rest when you can to regain your energy. Your doctor will encourage you to exercise by walking. Ask your doctor when you will be able to return to more strenuous exercise. Your doctor may suggest you do Kegel exercises to strengthen your pelvic muscles. To do these tighten your muscles as if you were stopping your urine flow. Hold for a few seconds and then relax. Do these throughout the day. Medications    Breastfeeding can cause your uterus to contract. If painful, your doctor may recommend a pain reliever. If you are breastfeeding, it's important to ask your doctor about taking medications. You may receive from the hospital a list of medications to avoid. Once your doctor has approved your medications, it's important to: Take your medication as directed. Do not change the amount or the schedule. Do not stop taking them without talking to your doctor. Do not share them. Know what the results and side effects may be. Report bothersome side effects to your doctor. Some drugs can be dangerous when mixed. Talk to a doctor or pharmacist if you are taking more than one drug. This includes over-the-counter medication and herb or dietary supplements. Plan ahead for refills so you don't run out.      Lifestyle Changes    Having a baby requires significant lifestyle changes. You and your doctor will plan lifestyle changes that will help you recover. Some things to keep in mind include: It is important to get enough rest so you can recover. Try sleeping when the baby sleeps. Ask your doctor when you can resume sexual relations. If you have not done so already, talk to your doctor about birth control options. If you are breastfeeding, consider a breast pump. Call your obstetrician and/or pediatrician for any questions that arise. Understand the changes your body is going through as it recovers from childbirth:   Hot and cold flashes as your body adjusts to new hormone and blood flow levels   Urinary or fecal incontinence due to stretched muscles   After pains from uterine contractions as the uterus shrinks   Vaginal bleeding (called lochia), which is heavier than your period (generally stops within two months)   Baby blues, feelings of irritability, sadness, crying, or anxiety. Postpartum depression is more severe, occurring in 10%-20% of new moms. If you experience such symptoms, contact your doctor. Consider joining a support group for new mothers. You can get encouragement and learn parenting strategies. Call Your Doctor If Any of the Following Occurs   It is important for you to monitor your recovery once you leave the hospital. That way, you can alert your doctor to any problems immediately. If any of the following occurs, call your doctor:   Signs of infection, including fever and chills .  Temperature  Over 100.4*  Increased bleeding: soaking more than one sanitary pad an hour    Wounds that become red, swollen or drain pus    Vaginal discharge that smells foul    New pain, swelling, or tenderness in your legs    Pain that you can't control with the medications you've been given    Pain, burning, urgency or frequency of urination, or persistent bleeding in the urine    Cough, shortness of breath, or chest pain    Depression, suicidal thoughts, or feelings of harming your baby    Breasts that are hot, red and accompanied by fever    Any cracking or bleeding from the nipple or areola (the dark-colored area of the breast)      Return to office in 4 weeks for follow-up call as needed problems

## 2022-11-12 NOTE — PLAN OF CARE
Problem: Postpartum  Goal: Experiences normal postpartum course  Description:  Postpartum OB-Pregnancy care plan goal which identifies if the mother is experiencing a normal postpartum course  2022 2372 by Tonya Atkinson RN  Outcome: Completed  2022 by Marika Renteria RN  Outcome: Adequate for Discharge  Goal: Appropriate maternal -  bonding  Description:  Postpartum OB-Pregnancy care plan goal which identifies if the mother and  are bonding appropriately  Outcome: Completed  Goal: Establishment of infant feeding pattern  Description:  Postpartum OB-Pregnancy care plan goal which identifies if the mother is establishing a feeding pattern with their   Outcome: Completed  Goal: Incisions, wounds, or drain sites healing without S/S of infection  Outcome: Completed     Problem: Pain  Goal: Verbalizes/displays adequate comfort level or baseline comfort level  2022 by Tonya Atkinson RN  Outcome: Completed  2022 by Marika Renteria RN  Outcome: Progressing     Problem: Infection - Adult  Goal: Absence of infection at discharge  Outcome: Completed  Goal: Absence of infection during hospitalization  Outcome: Completed  Goal: Absence of fever/infection during anticipated neutropenic period  Outcome: Completed     Problem: Safety - Adult  Goal: Free from fall injury  Outcome: Completed     Problem: Discharge Planning  Goal: Discharge to home or other facility with appropriate resources  Outcome: Completed     Problem: Chronic Conditions and Co-morbidities  Goal: Patient's chronic conditions and co-morbidity symptoms are monitored and maintained or improved  Outcome: Completed

## 2022-11-12 NOTE — PROGRESS NOTES
Teaching completed. Discharge instructions and script given. Info on Counseling services given. Pt verbalized understanding.

## 2022-11-12 NOTE — CARE COORDINATION
2022: SS Note:  SS Consult noted regarding \"positive UDS at delivery\" pt's UDS + for Cannabinoid, 's UDS negative for any illicit drugs, met with pt, Maribel, introduced self and role, she was currently holding her , daughter, Jeremiah Dawn, appeared very happy and was loving and attentive toward her baby during sw visit, no other concerns reported by nursing staff. She reports eating \"gummies\" due to nausea, reports she was throwing up constantly and they helped control her nausea, she denies illicit drug use or need for any drug treatment or resources. She reports living with her fiance/father of baby, Raven Tucker who she list as her support person for plan of care and her 2 boys, 8y and 8y old, denies any past involvement with [facility], no mental health or PPD history or concerns noted. She reports having all the necessary supports and provisions to safely take her baby home today and is already active with [facility] Jose BRITT- mandated  guide for plan of safe care assessment completed and report called to [address] [place] [address] Hollie Arcos [facility] 2. [facility] on-, she will review with agency supervisor but anticipate referral being \"screened out\" with NO ongoing agency services, NO HOLD on  and [address] [place] [address] Avenue, baby may be released home, pt and nursing informed. Electronically signed by RUBY Elkins on 2022 at 12:55 PM

## 2022-11-12 NOTE — PROGRESS NOTES
Subjective:     Postpartum Day 2   The patient feels well. The patient denies emotional concerns. Pain is well controlled with current medications. The baby iswell. Baby is feeding via bottle - Similac with low iron. Urinary output is adequate. The patient is ambulating well. The patient is tolerating a normal diet. Flatus has been passed. Objective:        Blood pressure 116/66, pulse 79, temperature 98.4 °F (36.9 °C), temperature source Oral, resp. rate 16, height 5' 7\" (1.702 m), weight 298 lb (135.2 kg), SpO2 99 %, unknown if currently breastfeeding. No intake/output data recorded. Lab Results   Component Value Date    WBC 9.0 11/10/2022    HGB 13.2 11/11/2022    HCT 39.6 11/11/2022    MCV 87.4 11/10/2022     11/10/2022   Hepatitis B surface antigen is negative and the hep C antibody is positive    General:    alert, appears stated age, and cooperative   Lungs:    Lochia:  appropriate   Uterine    firmnontender   Back         no pain to palpation   DVT Evaluation:  No evidence of DVT seen on physical exam.  Negative Nimco's sign.   @ LABS@    Assessment:     Postpartum day 2 doing well.female  Principal Problem:    Supervision of low-risk first pregnancy, third trimester  Resolved Problems:    * No resolved hospital problems. *    Plan:     Discharge home with standard precautions and return to clinic in 4-6 weeks. Prenatal vit daily, patient will be referred to infectious disease physician ordering gastroenterologist for further management of hepatitis C.   Pain meds as needed, routine postpartum care      Sarah Lazo MD,M.D. 11/12/2022 11:43 AM    Frida Zavala  1990  [id number]

## 2022-11-12 NOTE — DISCHARGE SUMMARY
Patient ID:  Ivania Hassan  [id number]  89 y.o.  1990         Discharge Summary      Admit date: 11/10/2022    Discharge date and time:   2022    Admitting Physician: Maya Gallardo MDMEnrike GIORDANO. FACOG     Diagnoses: Supervision of low-risk first pregnancy, third trimester [Z34.03] induction of labor with Pitocin spontaneous viable vaginal delivery of female infant without any complications. Discharged Condition: stable, hepatitis B surface antigen is nonreactive. Indication for Admission: 29 y.o. y.oEnrike Toribio admitted at Term pregnancy in :\"Induced\" labor with  Pitocin  without complications    Procedures Performed: Labor & Delivery Summary  Dilation Complete Date: 11/10/22  Dilation Complete Time: 7646    Female, precipitous delivery      Information for the patient's :  Raina Carvajal [[id number]]    . apgars 9 at 5 minutes  Information for the patient's :  Raina aCrvajal [[id number]]        . Weight of the baby [address]  Hospital Course: Patient was admitted on the day  and underwent an uncomplicated procedure. Pt received analgesics IV and /or epidural anesthesia and delivered without comps. postpartum care was uncomplicated. H/H stable,Vital stable,,Uterus nontender,perineum healing well/ incision and wound dry no bleeding or oozing, Moderate lochia,voided without probs and passed flatus. Discharge Exam:  /66   Pulse 79   Temp 98.4 °F (36.9 °C) (Oral)   Resp 16   Ht 5' 7\" (1.702 m)   Wt 298 lb (135.2 kg)   SpO2 99%   Breastfeeding Unknown   BMI 46.67 kg/m²   General appearance: alert, appears stated age, and cooperative  Back: negative, symmetric, no curvature. ROM normal. No CVA tenderness.   Abdomen: soft, non-tender; bowel sounds normal; no masses,  no organomegaly uterus well contracted nontender moderate lochia  Extremities: extremities normal, atraumatic, no cyanosis or edema and Homans sign is negative, no sign of DVT    Disposition: home    Patient Instructions: Activity: activity as tolerated  Diet: regular diet  Wound Care: none needed    Discharge Medication:      Medication List        START taking these medications      ibuprofen 800 MG tablet  Commonly known as: ADVIL;MOTRIN  Take 1 tablet by mouth in the morning and 1 tablet at noon and 1 tablet in the evening. Do all this for 7 days. CONTINUE taking these medications      PRENATAL 1 PO               Where to Get Your Medications        You can get these medications from any pharmacy    Bring a paper prescription for each of these medications  ibuprofen 800 MG tablet          Follow-up with Laney Rojas MD,MLOLA. in 4 weeks.     Signed:  Laney Rojas MD,MLOLA.  11/12/2022  11:48 AM

## 2022-11-12 NOTE — PROGRESS NOTES
Assuming care of pt at this time for 11p-7a. RN to bedside. FOB found sleeping with  in the chair at bedside. Baby was placed in bassinet and parents were educated and reminded of safe sleep practices and that the baby must sleep in the bassinet. Informed them if they are tired and need some rest we can take  to the nursery. They declined at this time. Both verbalized agreement about safe sleep. Pt requesting pain meds. No other needs at this time. Call light in reach.